# Patient Record
Sex: FEMALE | Race: WHITE
[De-identification: names, ages, dates, MRNs, and addresses within clinical notes are randomized per-mention and may not be internally consistent; named-entity substitution may affect disease eponyms.]

---

## 2020-09-08 ENCOUNTER — HOSPITAL ENCOUNTER (OUTPATIENT)
Dept: HOSPITAL 88 - CT | Age: 70
End: 2020-09-08
Attending: INTERNAL MEDICINE
Payer: MEDICARE

## 2020-09-08 DIAGNOSIS — R05: Primary | ICD-10-CM

## 2020-09-08 PROCEDURE — 71250 CT THORAX DX C-: CPT

## 2020-09-08 NOTE — DIAGNOSTIC IMAGING REPORT
EXAM: CT Chest WITHOUT intravenous contrast 9/8/2020 2:00 PM

INDICATION:  Persistent cough

COMPARISON: Chest CT 6/19/2017

TECHNIQUE:

Chest was scanned utilizing a multidetector helical scanner from the lung apex

through the level of the adrenal glands without administration of IV contrast.

Coronal and sagittal reformations were obtained. Routine protocol was

performed.



IV CONTRAST: None

RADIATION DOSE: Total DLP: 259 mGy*cm. Dose modulation, iterative

reconstruction, and/or weight based adjustment of the mA/kV was utilized to

reduce the radiation dose to as low as reasonably achievable. 

COMPLICATIONS: None



FINDINGS:



LINES/ TUBES: None.



LUNGS AND AIRWAYS:  The central airways are patent. Mild bilateral upper lobe

predominant centrilobular emphysema. No focal consolidation or pulmonary edema.

No suspicious pulmonary nodules.  Airways are normal.



PLEURA: The pleural spaces are clear.



HEART AND MEDIASTINUM: The thyroid gland is normal.  No mediastinal, hilar or

axillary lymphadenopathy.  The heart is normal in size.. There is no

pericardial effusion.  Mild scattered athetotic calcifications of the aorta and

coronary arteries.



UPPER ABDOMEN: No acute findings in the upper abdomen.



BONES: No acute osseous injury. Mild degenerative changes of the visualized

spine.



SOFT TISSUES: Unremarkable.



IMPRESSION: 

No focal pneumonia or pulmonary edema.



Mild bilateral upper lobe predominant centrilobular emphysema.





Signed by: Nikolas Santillan MD on 9/8/2020 4:43 PM

## 2025-06-06 LAB
ACANTHOCYTES BLD QL SMEAR: (no result)
ANION GAP SERPL CALC-SCNC: 15.2 MMOL/L (ref 8–16)
ANISOCYTOSIS BLD QL SMEAR: (no result)
AUER BODIES BLD QL SMEAR: (no result)
BASO STIPL BLD QL SMEAR: (no result)
BASOPHILS # BLD AUTO: 0 10*3/UL (ref 0–0.1)
BASOPHILS NFR BLD AUTO: 0.8 % (ref 0–1)
BASOPHILS NFR SPEC MANUAL: (no result) % (ref 0–1.5)
BLASTS NFR BLD MANUAL: (no result) %
BUN SERPL-MCNC: 10 MG/DL (ref 7–26)
BUN/CREAT SERPL: 12 (ref 6–25)
BURR CELLS BLD QL SMEAR: (no result)
BURR CELLS BLD QL SMEAR: (no result)
CALCIUM SERPL-MCNC: 9.1 MG/DL (ref 8.4–10.2)
CHLORIDE SERPL-SCNC: 102 MMOL/L (ref 98–107)
CO2 SERPL-SCNC: 27 MMOL/L (ref 22–29)
CREAT SERPL-MCNC: 0.82 MG/DL (ref 0.57–1.11)
DACRYOCYTES BLD QL SMEAR: (no result)
DEPRECATED NEUTROPHILS # BLD AUTO: 2.5 10*3/UL (ref 2.1–6.9)
DOHLE BOD BLD QL SMEAR: (no result)
EGFRCR SERPLBLD CKD-EPI 2021: 75 ML/MIN (ref 60–?)
ELLIPTOCYTES BLD QL SMEAR: (no result)
EOSINOPHIL # BLD AUTO: 0.1 10*3/UL (ref 0–0.4)
EOSINOPHIL NFR BLD AUTO: 3.1 % (ref 0–6)
EOSINOPHIL NFR BLD MANUAL: (no result) % (ref 0–7)
ERYTHROCYTE [DISTWIDTH] IN CORD BLOOD: 12.1 % (ref 11.7–14.4)
GIANT PLATELETS BLD QL SMEAR: (no result)
GLUCOSE SERPLBLD-MCNC: 91 MG/DL (ref 74–118)
HCT VFR BLD AUTO: 37.4 % (ref 34.2–44.1)
HELMET CELLS BLD QL SMEAR: (no result)
HGB BLD-MCNC: 12.5 G/DL (ref 12–16)
HOWELL-JOLLY BOD BLD QL SMEAR: (no result)
HYPOCHROMIA BLD QL SMEAR: (no result)
LG PLATELETS BLD QL SMEAR: (no result)
LYMPHOCYTES # BLD: 0.8 10*3/UL (ref 1–3.2)
LYMPHOCYTES NFR BLD AUTO: 20.3 % (ref 18–39.1)
LYMPHOCYTES NFR BLD MANUAL: (no result) % (ref 19–48)
MACROCYTES BLD QL SMEAR: (no result)
MCH RBC QN AUTO: 31.6 PG (ref 28–32)
MCHC RBC AUTO-ENTMCNC: 33.4 G/DL (ref 31–35)
MCV RBC AUTO: 94.7 FL (ref 81–99)
METAMYELOCYTES NFR BLD MANUAL: (no result) % (ref 0–0)
MICROCYTES BLD QL SMEAR: (no result)
MONOCYTES # BLD AUTO: 0.4 10*3/UL (ref 0.2–0.8)
MONOCYTES NFR BLD AUTO: 10.1 % (ref 4.4–11.3)
MONOCYTES NFR BLD MANUAL: (no result) % (ref 3.4–9)
MYELOCYTES NFR BLD MANUAL: (no result) % (ref 0–0)
NEUTS BAND NFR BLD MANUAL: (no result) %
NEUTS HYPERSEG BLD QL SMEAR: (no result)
NEUTS SEG NFR BLD AUTO: 65.4 % (ref 38.7–80)
NEUTS SEG NFR BLD MANUAL: (no result) % (ref 40–74)
NEUTS VAC BLD QL SMEAR: (no result)
NRBC # BLD: (no result) 10*3/UL
NRBC/RBC NFR BLD MANUAL: (no result) %
OVALOCYTES BLD QL SMEAR: (no result)
PAPPENHEIMER BOD BLD QL SMEAR: (no result)
PLASMA CELLS NFR BLD: (no result) %
PLAT MORPH BLD: (no result)
PLATELET # BLD AUTO: 158 X10E3/UL (ref 140–360)
PLATELET # BLD EST: (no result) 10*3/UL
PLATELET CLUMP BLD QL SMEAR: (no result)
POIKILOCYTOSIS BLD QL SMEAR: (no result)
POLYCHROMASIA BLD QL SMEAR: (no result)
POTASSIUM SERPL-SCNC: 4.2 MMOL/L (ref 3.5–5.1)
PROMYELOCYTES NFR BLD MANUAL: (no result) % (ref 0–0)
RBC # BLD AUTO: 3.95 X10E6/UL (ref 3.6–5.1)
RBC MORPH BLD: (no result)
ROULEAUX BLD QL SMEAR: (no result)
SCHISTOCYTES BLD QL SMEAR: (no result)
SICKLE CELLS BLD QL SMEAR: (no result)
SMUDGE CELLS BLD QL SMEAR: (no result)
SODIUM SERPL-SCNC: 140 MMOL/L (ref 136–145)
SPHEROCYTES BLD QL SMEAR: (no result)
STOMATOCYTES BLD QL SMEAR: (no result)
TARGETS BLD QL SMEAR: (no result)
TOXIC GRANULES BLD QL SMEAR: (no result)
VARIANT LYMPHS NFR BLD: (no result) %
WBC # BLD: 3.85 X10E3/UL (ref 4.8–10.8)
WBC NRBC COR # BLD: (no result) 10*3/UL

## 2025-06-13 ENCOUNTER — HOSPITAL ENCOUNTER (OUTPATIENT)
Dept: HOSPITAL 88 - OR | Age: 75
Discharge: HOME | End: 2025-06-13
Attending: PODIATRIST
Payer: MEDICARE

## 2025-06-13 VITALS
DIASTOLIC BLOOD PRESSURE: 55 MMHG | SYSTOLIC BLOOD PRESSURE: 113 MMHG | OXYGEN SATURATION: 95 % | RESPIRATION RATE: 16 BRPM | HEART RATE: 78 BPM

## 2025-06-13 DIAGNOSIS — Z01.810: ICD-10-CM

## 2025-06-13 DIAGNOSIS — F17.200: ICD-10-CM

## 2025-06-13 DIAGNOSIS — Z01.818: ICD-10-CM

## 2025-06-13 DIAGNOSIS — M20.41: Primary | ICD-10-CM

## 2025-06-13 DIAGNOSIS — F32.A: ICD-10-CM

## 2025-06-13 DIAGNOSIS — R05.9: ICD-10-CM

## 2025-06-13 DIAGNOSIS — E78.5: ICD-10-CM

## 2025-06-13 DIAGNOSIS — Z88.0: ICD-10-CM

## 2025-06-13 DIAGNOSIS — F41.9: ICD-10-CM

## 2025-06-13 DIAGNOSIS — Z85.828: ICD-10-CM

## 2025-06-13 DIAGNOSIS — Z01.812: ICD-10-CM

## 2025-06-13 DIAGNOSIS — Z79.899: ICD-10-CM

## 2025-06-13 DIAGNOSIS — M77.51: ICD-10-CM

## 2025-06-13 DIAGNOSIS — I10: ICD-10-CM

## 2025-06-13 PROCEDURE — 85025 COMPLETE CBC W/AUTO DIFF WBC: CPT

## 2025-06-13 PROCEDURE — 36415 COLL VENOUS BLD VENIPUNCTURE: CPT

## 2025-06-13 PROCEDURE — 28285 REPAIR OF HAMMERTOE: CPT

## 2025-06-13 PROCEDURE — 93005 ELECTROCARDIOGRAM TRACING: CPT

## 2025-06-13 PROCEDURE — 71046 X-RAY EXAM CHEST 2 VIEWS: CPT

## 2025-06-13 PROCEDURE — 80048 BASIC METABOLIC PNL TOTAL CA: CPT

## 2025-06-13 RX ADMIN — SODIUM CHLORIDE ONE: 900 INJECTION INTRAVENOUS at 06:30

## 2025-06-13 RX ADMIN — SODIUM CHLORIDE ONE: 9 INJECTION, SOLUTION INTRAVENOUS at 06:28

## 2025-06-13 RX ADMIN — SODIUM CHLORIDE ONE ML/HR: 900 INJECTION, SOLUTION INTRAVENOUS at 07:21

## 2025-06-13 RX ADMIN — SODIUM CHLORIDE, POTASSIUM CHLORIDE, SODIUM LACTATE AND CALCIUM CHLORIDE ONE ML/HR: 600; 310; 30; 20 INJECTION, SOLUTION INTRAVENOUS at 06:28

## 2025-06-13 RX ADMIN — SODIUM CHLORIDE ONE GM: 9 INJECTION, SOLUTION INTRAVENOUS at 07:21
